# Patient Record
Sex: FEMALE
[De-identification: names, ages, dates, MRNs, and addresses within clinical notes are randomized per-mention and may not be internally consistent; named-entity substitution may affect disease eponyms.]

---

## 2017-04-06 ENCOUNTER — HOSPITAL ENCOUNTER (OUTPATIENT)
Dept: HOSPITAL 14 - H.ER | Age: 67
Setting detail: OBSERVATION
LOS: 1 days | Discharge: HOME | End: 2017-04-07
Attending: FAMILY MEDICINE | Admitting: FAMILY MEDICINE
Payer: MEDICARE

## 2017-04-06 VITALS — RESPIRATION RATE: 20 BRPM

## 2017-04-06 VITALS — BODY MASS INDEX: 37.8 KG/M2

## 2017-04-06 DIAGNOSIS — E11.65: ICD-10-CM

## 2017-04-06 DIAGNOSIS — Z88.6: ICD-10-CM

## 2017-04-06 DIAGNOSIS — Z79.899: ICD-10-CM

## 2017-04-06 DIAGNOSIS — L03.211: Primary | ICD-10-CM

## 2017-04-06 DIAGNOSIS — Z23: ICD-10-CM

## 2017-04-06 DIAGNOSIS — E78.00: ICD-10-CM

## 2017-04-06 DIAGNOSIS — I10: ICD-10-CM

## 2017-04-06 LAB
ALBUMIN/GLOB SERPL: 1.2 {RATIO} (ref 1–2.1)
ALP SERPL-CCNC: 85 U/L (ref 38–126)
ALT SERPL-CCNC: 35 U/L (ref 9–52)
AST SERPL-CCNC: 34 U/L (ref 14–36)
BASOPHILS # BLD AUTO: 0.1 K/UL (ref 0–0.2)
BASOPHILS NFR BLD: 0.9 % (ref 0–2)
BILIRUB SERPL-MCNC: 0.7 MG/DL (ref 0.2–1.3)
BUN SERPL-MCNC: 20 MG/DL (ref 7–17)
CALCIUM SERPL-MCNC: 9.5 MG/DL (ref 8.4–10.2)
CHLORIDE SERPL-SCNC: 97 MMOL/L (ref 98–107)
CO2 SERPL-SCNC: 28 MMOL/L (ref 22–30)
EOSINOPHIL # BLD AUTO: 0.2 K/UL (ref 0–0.7)
EOSINOPHIL NFR BLD: 3.7 % (ref 0–4)
ERYTHROCYTE [DISTWIDTH] IN BLOOD BY AUTOMATED COUNT: 14.5 % (ref 11.5–14.5)
GLOBULIN SER-MCNC: 3.3 GM/DL (ref 2.2–3.9)
GLUCOSE SERPL-MCNC: 403 MG/DL (ref 65–105)
HCT VFR BLD CALC: 43.6 % (ref 34–47)
LYMPHOCYTES # BLD AUTO: 1.6 K/UL (ref 1–4.3)
LYMPHOCYTES NFR BLD AUTO: 23.6 % (ref 20–40)
MCH RBC QN AUTO: 28.5 PG (ref 27–31)
MCHC RBC AUTO-ENTMCNC: 33.4 G/DL (ref 33–37)
MCV RBC AUTO: 85.2 FL (ref 81–99)
MONOCYTES # BLD: 0.4 K/UL (ref 0–0.8)
MONOCYTES NFR BLD: 6.1 % (ref 0–10)
NEUTROPHILS # BLD: 4.4 K/UL (ref 1.8–7)
NEUTROPHILS NFR BLD AUTO: 65.7 % (ref 50–75)
NRBC BLD AUTO-RTO: 0.2 % (ref 0–0)
PLATELET # BLD: 172 K/UL (ref 130–400)
PMV BLD AUTO: 8.9 FL (ref 7.2–11.7)
POTASSIUM SERPL-SCNC: 5.6 MMOL/L (ref 3.6–5)
PROT SERPL-MCNC: 7.2 G/DL (ref 6.3–8.2)
SODIUM SERPL-SCNC: 136 MMOL/L (ref 132–148)
WBC # BLD AUTO: 6.7 K/UL (ref 4.8–10.8)

## 2017-04-06 PROCEDURE — 96361 HYDRATE IV INFUSION ADD-ON: CPT

## 2017-04-06 PROCEDURE — 82948 REAGENT STRIP/BLOOD GLUCOSE: CPT

## 2017-04-06 PROCEDURE — 36415 COLL VENOUS BLD VENIPUNCTURE: CPT

## 2017-04-06 PROCEDURE — 83605 ASSAY OF LACTIC ACID: CPT

## 2017-04-06 PROCEDURE — 93005 ELECTROCARDIOGRAM TRACING: CPT

## 2017-04-06 PROCEDURE — 96360 HYDRATION IV INFUSION INIT: CPT

## 2017-04-06 PROCEDURE — 90732 PPSV23 VACC 2 YRS+ SUBQ/IM: CPT

## 2017-04-06 PROCEDURE — 84132 ASSAY OF SERUM POTASSIUM: CPT

## 2017-04-06 PROCEDURE — 85025 COMPLETE CBC W/AUTO DIFF WBC: CPT

## 2017-04-06 PROCEDURE — 99283 EMERGENCY DEPT VISIT LOW MDM: CPT

## 2017-04-06 PROCEDURE — 87040 BLOOD CULTURE FOR BACTERIA: CPT

## 2017-04-06 PROCEDURE — 96365 THER/PROPH/DIAG IV INF INIT: CPT

## 2017-04-06 PROCEDURE — 80053 COMPREHEN METABOLIC PANEL: CPT

## 2017-04-06 NOTE — ED PDOC
HPI: Skin/Bite Injury


Time Seen by Provider: 04/06/17 13:30


Chief Complaint (Nursing): Abnormal Skin Integrity


Chief Complaint (Provider): Abnormal Skin Integrity


History Per: Patient


History/Exam Limitations: no limitations


Onset/Duration Of Symptoms: Days (x2 days)


Current Symptoms Are (Timing): Still Present


Additional Complaint(s): 


65 y/o female with a past history of diabetes (insulin dependant) who presents 

to the emergency department with a complaint of swelling and redness of the 

face x2 days. Patient states it first started at the chin but progressively 

getting worse and spreading throughout the face. Denies pain or itchiness.   





PMD: Dr. Pooja Gamino MD





Past Medical History


Reviewed: Historical Data, Nursing Documentation, Vital Signs


Vital Signs: 


 Last Vital Signs











Temp  96.9 F L  04/06/17 13:25


 


Pulse  89   04/06/17 13:25


 


Resp  21   04/06/17 13:25


 


BP  149/61   04/06/17 13:25


 


Pulse Ox  97   04/06/17 14:53














- Medical History


PMH: Diabetes, HTN, Hypercholesterolemia





- Surgical History


Surgical History: Cholecystectomy





- Family History


Family History: States: Diabetes





- Home Medications


Home Medications: 


 Ambulatory Orders











 Medication  Instructions  Recorded


 


Ergocalciferol [Vitamin D2] 400 iu PO QWK 03/03/15


 


Glimepiride 4 mg PO BID 03/03/15


 


Lisinopril/Hydrochlorothiazide 1 tab PO DAILY 03/03/15





[Lisinopril-Hydrochlorothiazide  





12.5 mg-10 mg]  


 


MetFORMIN [glucoPHAGE] 1,000 mg PO BID 03/03/15


 


Simvastatin 20 mg PO DAILY 03/03/15


 


Hydrocodone/Ibuprofen [Vicoprofen 1 tab PO Q6H #15 tab 06/19/15





7.5 mg-200 mg]  


 


traMADol [Ultram] 50 mg PO Q8 #20 tab 03/06/16


 


Methylprednisolone [Medrol Dose 4 mg PO DAILY #21 mg 12/21/16





Pack (21 tabs)]  


 


DiphenhydrAMINE [Benadryl] 1 - 2 cap PO Q6 PRN #30 cap 02/25/17


 


Methylprednisolone [Medrol Dose 4 mg PO DAILY #21 mg 02/25/17





Pack (21 tabs)]  














- Allergies


Allergies/Adverse Reactions: 


 Allergies











Allergy/AdvReac Type Severity Reaction Status Date / Time


 


acetaminophen [From Tylenol] Allergy  RASH Verified 03/06/16 10:17














Review of Systems


ROS Statement: Except As Marked, All Systems Reviewed And Found Negative


Skin: Positive for: Other (Swelling and redness of the face and chin. Denies 

pain or itchiness)





Physical Exam





- Reviewed


Nursing Documentation Reviewed: Yes


Vital Signs Reviewed: Yes





- Physical Exam


Appears: Positive for: Non-toxic, No Acute Distress


Skin: Negative for: Normal Color (Induration noted along the chin with redness 

noted along the mallar surfaces and above the right eye. No dental tenderness 

noted.)


Neurologic/Psych: Positive for: Alert, Oriented





- Laboratory Results


Result Diagrams: 


 04/06/17 14:12





 04/06/17 14:12





- ECG


ECG Rhythm: Positive for: Sinus Rhythm (NSR 74BPM NO ECTOPY; NO ACUTE CHANGES)


O2 Sat by Pulse Oximetry: 97 (RA)


Pulse Ox Interpretation: Normal





- Progress


ED Course And Treament: 


ANCEF 1 GM IV X 1 DOS





POTASSIUM ELEVATED.


EKG: NSR 74BPM; NO ECTOPY; NO ACUTE CHANGES





INSULIN 8 UNITS IV GIVEN; NS 1 LITER 500ML PER HOUR





D/W IRENE STAFFORD NP





Medical Decision Making


Medical Decision Making: 


Time: 13:30


Initial impression: Cellulitis


Initial plan:


--COMP Metabolic Panel


--Lact Acid, Plasma


--CBC w/ differential


--Ancef 1 gm/ ml Stat


--Blood Culture Stat


--Revaluation








Scribe Attestation:


Documented by Anu Keene, acting as a scribe for  Eliana wu PA-C.





Provider Scribe Attestation:


All medical record entries made by the Scribe were at my direction and 

personally dictated by me. I have reviewed the chart and agree that the record 

accurately reflects my personal performance of the history, physical exam, 

medical decision making, and the department course for this patient. I have 

also personally directed, reviewed, and agree with the discharge instructions 

and disposition.











Disposition





- Clinical Impression


Clinical Impression: 


 Cellulitis, Cellulitis, Hyperglycemia





- Patient ED Disposition


Is Patient to be Admitted: Yes





- Disposition


Disposition Time: 16:54


Condition: FAIR

## 2017-04-07 VITALS
TEMPERATURE: 97.9 F | DIASTOLIC BLOOD PRESSURE: 79 MMHG | HEART RATE: 68 BPM | OXYGEN SATURATION: 95 % | SYSTOLIC BLOOD PRESSURE: 122 MMHG

## 2017-04-07 LAB
ALBUMIN/GLOB SERPL: 1.1 {RATIO} (ref 1–2.1)
ALP SERPL-CCNC: 76 U/L (ref 38–126)
ALT SERPL-CCNC: 29 U/L (ref 9–52)
AST SERPL-CCNC: 28 U/L (ref 14–36)
BASOPHILS # BLD AUTO: 0 K/UL (ref 0–0.2)
BASOPHILS NFR BLD: 0.5 % (ref 0–2)
BILIRUB SERPL-MCNC: 0.5 MG/DL (ref 0.2–1.3)
BUN SERPL-MCNC: 18 MG/DL (ref 7–17)
CALCIUM SERPL-MCNC: 9.1 MG/DL (ref 8.4–10.2)
CHLORIDE SERPL-SCNC: 101 MMOL/L (ref 98–107)
CO2 SERPL-SCNC: 28 MMOL/L (ref 22–30)
EOSINOPHIL # BLD AUTO: 0.3 K/UL (ref 0–0.7)
EOSINOPHIL NFR BLD: 4 % (ref 0–4)
ERYTHROCYTE [DISTWIDTH] IN BLOOD BY AUTOMATED COUNT: 14.5 % (ref 11.5–14.5)
GLOBULIN SER-MCNC: 3.3 GM/DL (ref 2.2–3.9)
GLUCOSE SERPL-MCNC: 233 MG/DL (ref 65–105)
HCT VFR BLD CALC: 43.5 % (ref 34–47)
LYMPHOCYTES # BLD AUTO: 2 K/UL (ref 1–4.3)
LYMPHOCYTES NFR BLD AUTO: 29.5 % (ref 20–40)
MCH RBC QN AUTO: 28.3 PG (ref 27–31)
MCHC RBC AUTO-ENTMCNC: 33.2 G/DL (ref 33–37)
MCV RBC AUTO: 85.2 FL (ref 81–99)
MONOCYTES # BLD: 0.5 K/UL (ref 0–0.8)
MONOCYTES NFR BLD: 6.8 % (ref 0–10)
NEUTROPHILS # BLD: 4 K/UL (ref 1.8–7)
NEUTROPHILS NFR BLD AUTO: 59.2 % (ref 50–75)
NRBC BLD AUTO-RTO: 0.1 % (ref 0–0)
PLATELET # BLD: 160 K/UL (ref 130–400)
PMV BLD AUTO: 9.7 FL (ref 7.2–11.7)
POTASSIUM SERPL-SCNC: 4.5 MMOL/L (ref 3.6–5)
PROT SERPL-MCNC: 6.9 G/DL (ref 6.3–8.2)
SODIUM SERPL-SCNC: 142 MMOL/L (ref 132–148)
WBC # BLD AUTO: 6.7 K/UL (ref 4.8–10.8)

## 2017-04-07 NOTE — CP.PCM.DIS
Provider





- Provider


Date of Admission: 


04/06/17 16:17





Attending physician: 


Azael Landin MD





Time Spent in preparation of Discharge (in minutes): 15





Diagnosis





- Discharge Diagnosis


(1) Facial cellulitis


Status: Acute





(2) DVT prophylaxis


Status: Acute





(3) Type 2 diabetes mellitus with hyperglycemia


Status: Acute








Hospital Course





- Lab Results


Lab Results: 


 Most Recent Lab Values











WBC  6.7 K/uL (4.8-10.8)   04/07/17  05:50    


 


RBC  5.11 Mil/uL (3.80-5.20)   04/07/17  05:50    


 


Hgb  14.5 g/dL (12.0-16.0)   04/07/17  05:50    


 


Hct  43.5 % (34.0-47.0)   04/07/17  05:50    


 


MCV  85.2 fl (81.0-99.0)   04/07/17  05:50    


 


MCH  28.3 pg (27.0-31.0)   04/07/17  05:50    


 


MCHC  33.2 g/dL (33.0-37.0)   04/07/17  05:50    


 


RDW  14.5 % (11.5-14.5)   04/07/17  05:50    


 


Plt Count  160 K/uL (130-400)   04/07/17  05:50    


 


MPV  9.7 fl (7.2-11.7)   04/07/17  05:50    


 


Neut % (Auto)  59.2 % (50.0-75.0)   04/07/17  05:50    


 


Lymph % (Auto)  29.5 % (20.0-40.0)   04/07/17  05:50    


 


Mono % (Auto)  6.8 % (0.0-10.0)   04/07/17  05:50    


 


Eos % (Auto)  4.0 % (0.0-4.0)   04/07/17  05:50    


 


Baso % (Auto)  0.5 % (0.0-2.0)   04/07/17  05:50    


 


Neut #  4.0 K/uL (1.8-7.0)   04/07/17  05:50    


 


Lymph #  2.0 K/uL (1.0-4.3)   04/07/17  05:50    


 


Mono #  0.5 K/uL (0.0-0.8)   04/07/17  05:50    


 


Eos #  0.3 K/uL (0.0-0.7)   04/07/17  05:50    


 


Baso #  0.0 K/uL (0.0-0.2)   04/07/17  05:50    


 


Sodium  142 mmol/l (132-148)   04/07/17  05:50    


 


Potassium  4.5 MMOL/L (3.6-5.0)   04/07/17  05:50    


 


Chloride  101 mmol/L ()   04/07/17  05:50    


 


Carbon Dioxide  28 mmol/L (22-30)   04/07/17  05:50    


 


Anion Gap  18  (10-20)   04/07/17  05:50    


 


BUN  18 mg/dl (7-17)  H  04/07/17  05:50    


 


Creatinine  0.9 mg/dL (0.7-1.2)   04/07/17  05:50    


 


Est GFR ( Amer)  > 60   04/07/17  05:50    


 


Est GFR (Non-Af Amer)  > 60   04/07/17  05:50    


 


POC Glucose (mg/dL)  365 mg/dL ()  H  04/07/17  10:42    


 


Random Glucose  233 mg/dL ()  H  04/07/17  05:50    


 


Lactic Acid  2.1 MMOL/L (0.7-2.1)   04/06/17  14:12    


 


Calcium  9.1 mg/dL (8.4-10.2)   04/07/17  05:50    


 


Total Bilirubin  0.5 mg/dl (0.2-1.3)   04/07/17  05:50    


 


AST  28 U/L (14-36)   04/07/17  05:50    


 


ALT  29 U/L (9-52)   04/07/17  05:50    


 


Alkaline Phosphatase  76 U/L ()   04/07/17  05:50    


 


Total Protein  6.9 G/DL (6.3-8.2)   04/07/17  05:50    


 


Albumin  3.6 g/dL (3.5-5.0)   04/07/17  05:50    


 


Globulin  3.3 gm/dL (2.2-3.9)   04/07/17  05:50    


 


Albumin/Globulin Ratio  1.1  (1.0-2.1)   04/07/17  05:50    














Discharge Exam





- Head Exam


Head Exam: ATRAUMATIC, NORMAL INSPECTION, NORMOCEPHALIC





Discharge Plan





- Discharge Medications


Prescriptions: 


Cephalexin [cephalexin] 500 mg PO Q8 #18 cap





- Follow Up Plan


Condition: GOOD


Disposition: HOME/ ROUTINE


Additional Instructions: 


patient cleared for discharge to home today , d/w [facility]


f/u with pcp  in 1 week


pt . and family member at bedside instructed to return to ER if sx worsen, fever

, chill, worsening rash


Rx for Keflex provided





final dx-facial cellulitis improving


f/u pmd, rted prn, meds pe rmed rec


Referrals: 


Pooja Gamino, ANUSHA [Advanced Practice Nurse] -

## 2017-04-07 NOTE — CARD
--------------- APPROVED REPORT --------------





EKG Measurement

Heart [id number]

IN [id number]

[id number]

[id number]

[id number]



<Conclusion>

Normal sinus rhythm

Minimal voltage criteria for LVH, may be normal variant

Borderline ECG

## 2017-04-07 NOTE — CP.PCM.HP
History of Present Illness





- History of Present Illness


History of Present Illness: 


pt admitted for facial cellulitis, was initially tx w/ topical steroids for 

rash to face. at present feels better. denies resp or swalling difficulties. no 

f/c, n/v/d.  bw noted. no wbc. glucose elevated.  pt c/o s/s 3 days pta.  





Present on Admission





- Present on Admission


Any Indicators Present on Admission: Yes


History of Uncontrolled Diabetes: Yes





Review of Systems





- Integumentary


Integumentary: As Per HPI, Erythema





Past Patient History





- Infectious Disease


Hx of Infectious Diseases: None





- Past Medical History & Family History


Past Medical History?: Yes





- Past Social History


Smoking Status: Never Smoked





- CARDIAC


Hx Hypercholesterolemia: Yes


Hx Hypertension: Yes





- PULMONARY


Hx Respiratory Disorders: No





- NEUROLOGICAL


Hx Neurological Disorder: No





- HEENT


Hx HEENT Problems: Yes





- ENDOCRINE/METABOLIC


Hx Endocrine Disorders: Yes





- HEMATOLOGICAL/ONCOLOGICAL


Hx Blood Disorders: No





- INTEGUMENTARY


Hx Dermatological Problems: No





- MUSCULOSKELETAL/RHEUMATOLOGICAL


Hx Musculoskeletal Disorders: No





- GASTROINTESTINAL


Hx Gastrointestinal Disorders: No





- GENITOURINARY/GYNECOLOGICAL


Hx Genitourinary Disorders: No





- PSYCHIATRIC


Hx Psychophysiologic Disorder: No





- SURGICAL HISTORY


Hx Cholecystectomy: Yes





- ANESTHESIA


Hx Anesthesia: Yes


Hx Anesthesia Reactions: No


Hx Malignant Hyperthermia: No





Meds


Home Medications: 


 Home Medication List











 Medication  Instructions  Recorded  Confirmed  Type


 


Cephalexin [cephalexin] 500 mg PO Q8 #18 cap 04/07/17  Rx











Allergies/Adverse Reactions: 


 Allergies











Allergy/AdvReac Type Severity Reaction Status Date / Time


 


acetaminophen [From Tylenol] Allergy  RASH Verified 03/06/16 10:17














Physical Exam





- Constitutional


Appears: Well, Non-toxic, No Acute Distress





- Head Exam


Head Exam: ATRAUMATIC, NORMAL INSPECTION, NORMOCEPHALIC





- Eye Exam


Eye Exam: EOMI, Normal appearance, PERRL


Pupil Exam: NORMAL ACCOMODATION, PERRL





- ENT Exam


ENT Exam: Mucous Membranes Moist, Normal Exam





- Neck Exam


Neck exam: Positive for: Normal Inspection





- Respiratory Exam


Respiratory Exam: Clear to Auscultation Bilateral, NORMAL BREATHING PATTERN





- Cardiovascular Exam


Cardiovascular Exam: REGULAR RHYTHM, RRR, +S1, +S2





- GI/Abdominal Exam


GI & Abdominal Exam: Normal Bowel Sounds, Soft.  absent: Tenderness





- Extremities Exam


Extremities exam: Positive for: full ROM, normal capillary refill, normal 

inspection, pedal pulses present





- Back Exam


Back exam: NORMAL INSPECTION





- Neurological Exam


Neurological exam: Alert, CN II-XII Intact, Normal Gait, Oriented x3, Reflexes 

Normal





- Psychiatric Exam


Psychiatric exam: Normal Affect, Normal Mood





- Skin


Skin Exam: Dry, Intact, Normal Color, Warm





Results





- Vital Signs


Recent Vital Signs: 





 Last Vital Signs











Temp  98.4 F   04/06/17 19:59


 


Pulse  72   04/06/17 19:59


 


Resp  20   04/06/17 19:59


 


BP  99/62 L  04/06/17 19:59


 


Pulse Ox  96   04/06/17 19:59














- Labs


Result Diagrams: 


 04/07/17 05:50





 04/07/17 05:50


Labs: 





 Laboratory Results - last 24 hr











  04/06/17 04/06/17 04/06/17





  16:42 17:59 21:47


 


Potassium   4.1 


 


POC Glucose (mg/dL)  236 H   253 H














  04/07/17





  06:01


 


Potassium 


 


POC Glucose (mg/dL)  246 H














Assessment & Plan


(1) Facial cellulitis


Assessment and Plan: 


ancef


ivf


pain control


?? dc today


Status: Acute





(2) DVT prophylaxis


Assessment and Plan: 


scd flakita e hose


ambulation


Status: Acute





(3) Type 2 diabetes mellitus with hyperglycemia


Assessment and Plan: 


riss fsbg, home meds


Status: Acute








Decision To Admit





- Pt Status Changed To:


Hospital Disposition Of: Observation





- .


Bed Request Type: Med/Surg


Admitting Physician: Azael Landin

## 2017-08-02 ENCOUNTER — HOSPITAL ENCOUNTER (EMERGENCY)
Dept: HOSPITAL 14 - H.ER | Age: 67
Discharge: HOME | End: 2017-08-02
Payer: MEDICARE

## 2017-08-02 VITALS
SYSTOLIC BLOOD PRESSURE: 140 MMHG | RESPIRATION RATE: 16 BRPM | HEART RATE: 72 BPM | DIASTOLIC BLOOD PRESSURE: 71 MMHG | TEMPERATURE: 98 F

## 2017-08-02 VITALS — OXYGEN SATURATION: 98 %

## 2017-08-02 VITALS — BODY MASS INDEX: 37.8 KG/M2

## 2017-08-02 DIAGNOSIS — Z79.84: ICD-10-CM

## 2017-08-02 DIAGNOSIS — L03.211: Primary | ICD-10-CM

## 2017-08-02 DIAGNOSIS — E11.9: ICD-10-CM

## 2017-08-02 DIAGNOSIS — E78.00: ICD-10-CM

## 2017-08-02 DIAGNOSIS — I10: ICD-10-CM

## 2017-08-02 LAB
ALBUMIN/GLOB SERPL: 1.2 {RATIO} (ref 1–2.1)
ALP SERPL-CCNC: 62 U/L (ref 38–126)
ALT SERPL-CCNC: 39 U/L (ref 9–52)
AST SERPL-CCNC: 38 U/L (ref 14–36)
BASOPHILS # BLD AUTO: 0.1 K/UL (ref 0–0.2)
BASOPHILS NFR BLD: 1.2 % (ref 0–2)
BILIRUB SERPL-MCNC: 0.7 MG/DL (ref 0.2–1.3)
BUN SERPL-MCNC: 23 MG/DL (ref 7–17)
CALCIUM SERPL-MCNC: 9.2 MG/DL (ref 8.4–10.2)
CHLORIDE SERPL-SCNC: 104 MMOL/L (ref 98–107)
CO2 SERPL-SCNC: 23 MMOL/L (ref 22–30)
EOSINOPHIL # BLD AUTO: 0.2 K/UL (ref 0–0.7)
EOSINOPHIL NFR BLD: 3.8 % (ref 0–4)
ERYTHROCYTE [DISTWIDTH] IN BLOOD BY AUTOMATED COUNT: 14.1 % (ref 11.5–14.5)
GLOBULIN SER-MCNC: 3.4 GM/DL (ref 2.2–3.9)
GLUCOSE SERPL-MCNC: 162 MG/DL (ref 65–105)
HCT VFR BLD CALC: 41.7 % (ref 34–47)
LYMPHOCYTES # BLD AUTO: 1.6 K/UL (ref 1–4.3)
LYMPHOCYTES NFR BLD AUTO: 25.1 % (ref 20–40)
MCH RBC QN AUTO: 28.2 PG (ref 27–31)
MCHC RBC AUTO-ENTMCNC: 33.3 G/DL (ref 33–37)
MCV RBC AUTO: 84.7 FL (ref 81–99)
MONOCYTES # BLD: 0.4 K/UL (ref 0–0.8)
MONOCYTES NFR BLD: 6.5 % (ref 0–10)
NEUTROPHILS # BLD: 4.1 K/UL (ref 1.8–7)
NEUTROPHILS NFR BLD AUTO: 63.4 % (ref 50–75)
NRBC BLD AUTO-RTO: 0 % (ref 0–0)
PLATELET # BLD: 168 K/UL (ref 130–400)
PMV BLD AUTO: 8.4 FL (ref 7.2–11.7)
POTASSIUM SERPL-SCNC: 4.9 MMOL/L (ref 3.6–5)
PROT SERPL-MCNC: 7.5 G/DL (ref 6.3–8.2)
SODIUM SERPL-SCNC: 137 MMOL/L (ref 132–148)
WBC # BLD AUTO: 6.5 K/UL (ref 4.8–10.8)

## 2017-08-02 PROCEDURE — 87040 BLOOD CULTURE FOR BACTERIA: CPT

## 2017-08-02 PROCEDURE — 86038 ANTINUCLEAR ANTIBODIES: CPT

## 2017-08-02 PROCEDURE — 86376 MICROSOMAL ANTIBODY EACH: CPT

## 2017-08-02 PROCEDURE — 86160 COMPLEMENT ANTIGEN: CPT

## 2017-08-02 PROCEDURE — 82948 REAGENT STRIP/BLOOD GLUCOSE: CPT

## 2017-08-02 PROCEDURE — 80053 COMPREHEN METABOLIC PANEL: CPT

## 2017-08-02 PROCEDURE — 99282 EMERGENCY DEPT VISIT SF MDM: CPT

## 2017-08-02 PROCEDURE — 85025 COMPLETE CBC W/AUTO DIFF WBC: CPT

## 2017-08-02 PROCEDURE — 96374 THER/PROPH/DIAG INJ IV PUSH: CPT

## 2017-08-02 PROCEDURE — 86431 RHEUMATOID FACTOR QUANT: CPT

## 2017-08-02 PROCEDURE — 83516 IMMUNOASSAY NONANTIBODY: CPT

## 2017-08-02 PROCEDURE — 86235 NUCLEAR ANTIGEN ANTIBODY: CPT

## 2017-08-02 PROCEDURE — 83605 ASSAY OF LACTIC ACID: CPT

## 2017-08-02 PROCEDURE — 96375 TX/PRO/DX INJ NEW DRUG ADDON: CPT

## 2017-08-02 NOTE — ED PDOC
HPI: Skin/Bite Injury


Time Seen by Provider: 08/02/17 14:12


Chief Complaint (Nursing): Abnormal Skin Integrity


Chief Complaint (Provider): facial rash


History Per: Patient


History/Exam Limitations: no limitations


Onset/Duration Of Symptoms: Waxing/Waning (for 6 months), Worse Since (2 weeks)


Location Of Injury: Right: Face, Left: Face, Anterior: Face


Quality Of Symptoms: Itching, Swollen


Additional Complaint(s): 





Facial rash ongoing for 6 months, with varying degrees of severity.


Had been diagnosed with rosacea by a dermatologist in [place], but and it 

improved with topical steroid


Also diagnosed with facial cellulitis in April, but no improvement after 

antibiotics.


Intermittently improves with oral steroid but it causes her sugar to be very 

elevated.


Last 2 weeks has developed increased swelling again, but also associated with 

itching with she has never had before.


Denies fever, chills, throat/tongue swelling, chest pain or shortness of breath

, or rash elsewhere.





PMD: Dr Gamino





Past Medical History


Reviewed: Historical Data, Nursing Documentation, Vital Signs


Vital Signs: 


 Last Vital Signs











Temp  98 F   08/02/17 14:04


 


Pulse  80   08/02/17 14:04


 


Resp  18   08/02/17 14:04


 


BP  146/66   08/02/17 14:04


 


Pulse Ox  98   08/02/17 14:52














- Medical History


PMH: Diabetes, HTN, Hypercholesterolemia





- Surgical History


Surgical History: Cholecystectomy





- Family History


Family History: States: Diabetes





- Social History


Current smoker - smoking cessation education provided: No


Alcohol: None





- Home Medications


Home Medications: 


 Ambulatory Orders











 Medication  Instructions  Recorded


 


Glimepiride 4 mg PO BID 03/03/15


 


Lisinopril/Hydrochlorothiazide 1 tab PO DAILY 03/03/15





[Lisinopril-Hctz 10-12.5 mg Tab]  


 


MetFORMIN [glucoPHAGE] 1,000 mg PO BID 03/03/15


 


Simvastatin 20 mg PO DAILY 03/03/15


 


Cephalexin [cephalexin] 500 mg PO Q8 #18 cap 04/07/17


 


Betamethasone Valerate 0.1% 1 appl TOP BID #1 tube 08/02/17





[Valisone]  


 


Clindamycin [Cleocin] 300 mg PO TID #30 cap 08/02/17


 


DiphenhydrAMINE [Benadryl] 25 mg PO Q6 PRN #30 cap 08/02/17














- Allergies


Allergies/Adverse Reactions: 


 Allergies











Allergy/AdvReac Type Severity Reaction Status Date / Time


 


acetaminophen [From Tylenol] Allergy  RASH Verified 08/02/17 14:02














Review of Systems


ROS Statement: Except As Marked, All Systems Reviewed And Found Negative (and 

as per HPI)


Constitutional: Negative for: Fever, Chills


ENT: Negative for: Mouth Swelling, Throat Pain


Cardiovascular: Negative for: Chest Pain, Light Headedness


Respiratory: Negative for: Cough, Shortness of Breath


Skin: Positive for: Rash





Physical Exam





- Reviewed


Nursing Documentation Reviewed: Yes


Vital Signs Reviewed: Yes





- Physical Exam


Appears: Positive for: Non-toxic, No Acute Distress


Head Exam: Positive for: ATRAUMATIC, NORMOCEPHALIC


Skin: Positive for: Warm, Dry, Rash (Blanching erythematous edematous rash to 

chin, nose, malar area and mid forehead with indurated areas especially to chin

, nasal bridge and midline forehead. No tenderness. No fluctuance. No discrete 

lesions/papules.)


Eye Exam: Positive for: EOMI, PERRL


ENT: Negative for: Pharyngeal Erythema, Tonsillar Exudate


Neck: Positive for: Painless ROM, Supple


Cardiovascular/Chest: Positive for: Regular Rate, Rhythm, Chest Non Tender.  

Negative for: Murmur


Respiratory: Positive for: Normal Breath Sounds.  Negative for: Respiratory 

Distress


Extremity: Positive for: Normal ROM.  Negative for: Pedal Edema


Lymphatic: Negative for: Adenopathy


Neurologic/Psych: Positive for: Alert.  Negative for: Motor/Sensory Deficits





- Laboratory Results


Result Diagrams: 


 08/02/17 15:33





 08/02/17 15:33


Interpretation Of Abn Labs: Minimally elevated BUN and glucose.  No emergently 

critical lab abnormalities





- ECG


O2 Sat by Pulse Oximetry: 98


Pulse Ox Interpretation: Normal





Medical Decision Making


Medical Decision Making: 





Facial erythema/edema/induration ongoing for months with unclear etiology.


Will treat as both inflammatory and infectious process for now, but pt needs to 

revisit dermatologist for further management.


LUCIO pt findings and plan of care.





Disposition





- Clinical Impression


Clinical Impression: 


 Facial rash





Counseled Patient/Family Regarding: Studies Performed, Diagnosis, Need For 

Followup, Rx Given





- Disposition


Referrals: 


Priyanka Gamino MD [Family Provider] - 


 Service [Outside] (VISITA UN SPECIALIST DE PIEL EN 2-3 JIMÉNEZ POR MAS 

EVALUACIONES Y TRATIMIENTE.)


Disposition: Routine/Home


Disposition Time: 16:19


Condition: STABLE


Additional Instructions: 


IT IS VERY IMPORTANT YOU SEE A DERMATOLOGIST FOR FURTHER TREATMENT OF YOUR 

CONDITION





CHECK YOUR SUGAR FREQUENTLY FOR THE NEXT 2 WEEKS BECAUSE STEROIDC CAN CAUSE 

INCREASE IN SUGAR





FOLLOW STRICT DIABETIC DIET FOR NEXT 2 WEEKS.








ES MUY IMPORTANTE ANNAMARIE UN DERMATLOGO PARA TRATAMIENTO ADICIONAL DE GAMBINO CONDICIN





COMPRUEBE GAMBINO AZCAR FRECUENTEMENTE POR LAS PROXIMAS 2 SEMANAS PORQUE ESTEROIDES 

PUEDE CAUSAR AUMENTO EN AZCAR





SIGUE JOSE ANTONIO DIETA DIABTICA ESTRICTA PARA LAS PROXIMAS 2 SEMANAS.





Prescriptions: 


Betamethasone Valerate 0.1% [Valisone] 1 appl TOP BID #1 tube


Clindamycin [Cleocin] 300 mg PO TID #30 cap


DiphenhydrAMINE [Benadryl] 25 mg PO Q6 PRN #30 cap


 PRN Reason: Itching / Pruritus


Instructions:  Dermatitis (ED)


Print Language: Japanese

## 2017-08-09 LAB
ANA TITR SER IF: (no result) {TITER}
ENA SM AB SER-ACNC: (no result)
Lab: (no result)
MYOCARDIAL AB IF: (no result)
MYOCARDIAL AB TITER: (no result)
PARIETAL CELL AB: (no result)
RETICULIN IGA TITR SER IF: (no result) {TITER}
RETICULIN IGA TITR SER IF: (no result) {TITER}
RHEUMATOID FACT SERPL-ACNC: (no result) [IU]/ML
STRIATED MUSCLE AB TITER: (no result)
STRIATED MUSCLE AB: (no result)

## 2017-09-24 ENCOUNTER — HOSPITAL ENCOUNTER (EMERGENCY)
Dept: HOSPITAL 14 - H.ER | Age: 67
Discharge: HOME | End: 2017-09-24
Payer: MEDICARE

## 2017-09-24 VITALS
DIASTOLIC BLOOD PRESSURE: 63 MMHG | SYSTOLIC BLOOD PRESSURE: 158 MMHG | TEMPERATURE: 98 F | RESPIRATION RATE: 16 BRPM | HEART RATE: 98 BPM | OXYGEN SATURATION: 98 %

## 2017-09-24 VITALS — BODY MASS INDEX: 37.8 KG/M2

## 2017-09-24 DIAGNOSIS — T78.40XA: Primary | ICD-10-CM

## 2017-09-24 PROCEDURE — 99282 EMERGENCY DEPT VISIT SF MDM: CPT

## 2017-09-24 PROCEDURE — 96372 THER/PROPH/DIAG INJ SC/IM: CPT

## 2017-09-24 NOTE — ED PDOC
HPI: Allergic Reaction


Time Seen by Provider: 09/24/17 12:38


Chief Complaint (Nursing): Allergic Reaction


Chief Complaint (Provider): Rash


History Per: Patient


History/Exam Limitations: no limitations


Onset/Duration Of Symptoms: Days (x2 months)


Additional Complaint(s): 





Anne Zuleta is a 66 year old female presenting to the ED for an evaluation of 

a red rash on her face occurring for 2 months, reporting this rash has been 

coming and going chronically for 9 months prior to arrival. She states she has 

previously been admitted for cellulitis and was on IV antibiotics. She is 

currently on Zyrtec, topical fungal medication, and doxycycline prescribed by 

her dermatologist, taken without relief. The patient is complaining of 

itchiness which prompted her ED visit today.





PMD: Albert Guaman MD








Past Medical History


Reviewed: Historical Data, Nursing Documentation, Vital Signs


Vital Signs: 


 Last Vital Signs











Temp  98.0 F   09/24/17 12:34


 


Pulse  98 H  09/24/17 12:34


 


Resp  16   09/24/17 12:34


 


BP  158/63 H  09/24/17 12:34


 


Pulse Ox  98   09/24/17 12:34














- Medical History


PMH: Diabetes, HTN, Hypercholesterolemia





- Surgical History


Surgical History: Cholecystectomy





- Family History


Family History: States: Diabetes





- Home Medications


Home Medications: 


 Ambulatory Orders











 Medication  Instructions  Recorded


 


Glimepiride 4 mg PO BID 03/03/15


 


Lisinopril/Hydrochlorothiazide 1 tab PO DAILY 03/03/15





[Lisinopril-Hctz 10-12.5 mg Tab]  


 


MetFORMIN [glucoPHAGE] 1,000 mg PO BID 03/03/15


 


Simvastatin 20 mg PO DAILY 03/03/15


 


Cephalexin [cephalexin] 500 mg PO Q8 #18 cap 04/07/17


 


Betamethasone Valerate 0.1% 1 appl TOP BID #1 tube 08/02/17





[Valisone]  


 


Clindamycin [Cleocin] 300 mg PO TID #30 cap 08/02/17


 


DiphenhydrAMINE [Benadryl] 25 mg PO Q6 PRN #30 cap 08/02/17


 


hydrOXYzine Pamoate [Vistaril] 50 mg PO BID PRN #15 cap 09/24/17


 


predniSONE [predniSONE Tab] 20 mg PO DAILY #12 tab 09/24/17














- Allergies


Allergies/Adverse Reactions: 


 Allergies











Allergy/AdvReac Type Severity Reaction Status Date / Time


 


acetaminophen [From Tylenol] Allergy  RASH Verified 08/02/17 14:02














Review of Systems


ROS Statement: Except As Marked, All Systems Reviewed And Found Negative


Skin: Positive for: Rash (associated with itchiness to face)





Physical Exam





- Reviewed


Nursing Documentation Reviewed: Yes


Vital Signs Reviewed: Yes





- Physical Exam


Appears: Positive for: Non-toxic, No Acute Distress


Head Exam: Positive for: ATRAUMATIC, NORMOCEPHALIC


Skin: Positive for: Rash (pruritic rash to face)


Neurologic/Psych: Positive for: Alert, Oriented





- ECG


O2 Sat by Pulse Oximetry: 98 (RA)


Pulse Ox Interpretation: Normal





Disposition





- Clinical Impression


Clinical Impression: 


 Rash








- Disposition


Disposition Time: 13:25


Condition: STABLE


Additional Instructions: 


Continue current medications





Prescriptions: 


hydrOXYzine Pamoate [Vistaril] 50 mg PO BID PRN #15 cap


 PRN Reason: Itching / Pruritus


predniSONE [predniSONE Tab] 20 mg PO DAILY #12 tab


Instructions:  Dermatitis (ED)


Forms:  [facility] (English)


Print Language: Faroese





Medical Decision Making


Medical Decision Making: 





Time: 12:38


Impression: Pruritic rash to face


Plan: 


* SOLU-Medrol 125 mg IM


* Vistaril 50 mg PO 





Discussed with pt to take medications as prescribed and to follow up with 

dermatologist.





--------------------------------------------------------------------------------

----------------- 


Scribe Attestation:


Documented by Marcela Bermudez, acting as a scribe for Latia Shine PA-C.


 


Provider Scribe Attestation:


All medical record entries made by the Scribe were at my direction and 

personally dictated by me. I have reviewed the chart and agree that the record 

accurately reflects my personal performance of the history, physical exam, 

medical decision making, and the department course for this patient. I have 

also personally directed, reviewed, and agree with the discharge instructions 

and disposition.

## 2018-10-20 ENCOUNTER — HOSPITAL ENCOUNTER (EMERGENCY)
Dept: HOSPITAL 14 - H.ER | Age: 68
Discharge: HOME | End: 2018-10-20
Payer: COMMERCIAL

## 2018-10-20 VITALS
DIASTOLIC BLOOD PRESSURE: 80 MMHG | TEMPERATURE: 97.8 F | HEART RATE: 84 BPM | RESPIRATION RATE: 18 BRPM | SYSTOLIC BLOOD PRESSURE: 128 MMHG

## 2018-10-20 VITALS — BODY MASS INDEX: 40.1 KG/M2

## 2018-10-20 DIAGNOSIS — M54.5: Primary | ICD-10-CM

## 2018-10-20 DIAGNOSIS — I10: ICD-10-CM

## 2018-10-20 DIAGNOSIS — E78.00: ICD-10-CM

## 2018-10-20 DIAGNOSIS — E11.9: ICD-10-CM

## 2018-10-20 DIAGNOSIS — G89.29: ICD-10-CM

## 2018-10-20 DIAGNOSIS — Z79.84: ICD-10-CM

## 2018-10-20 LAB
BASOPHILS # BLD AUTO: 0.1 K/UL (ref 0–0.2)
BASOPHILS NFR BLD: 1.1 % (ref 0–2)
BUN SERPL-MCNC: 25 MG/DL (ref 7–17)
CALCIUM SERPL-MCNC: 9.5 MG/DL (ref 8.4–10.2)
EOSINOPHIL # BLD AUTO: 0.3 K/UL (ref 0–0.7)
EOSINOPHIL NFR BLD: 5 % (ref 0–4)
ERYTHROCYTE [DISTWIDTH] IN BLOOD BY AUTOMATED COUNT: 14.7 % (ref 11.5–14.5)
ERYTHROCYTE [SEDIMENTATION RATE] IN BLOOD: 19 MM/HR (ref 0–30)
GFR NON-AFRICAN AMERICAN: 50
HGB BLD-MCNC: 15.1 G/DL (ref 12–16)
LYMPHOCYTES # BLD AUTO: 1.8 K/UL (ref 1–4.3)
LYMPHOCYTES NFR BLD AUTO: 26.4 % (ref 20–40)
MCH RBC QN AUTO: 28.7 PG (ref 27–31)
MCHC RBC AUTO-ENTMCNC: 33.1 G/DL (ref 33–37)
MCV RBC AUTO: 86.6 FL (ref 81–99)
MONOCYTES # BLD: 0.4 K/UL (ref 0–0.8)
MONOCYTES NFR BLD: 6.5 % (ref 0–10)
NEUTROPHILS # BLD: 4.1 K/UL (ref 1.8–7)
NEUTROPHILS NFR BLD AUTO: 61 % (ref 50–75)
NRBC BLD AUTO-RTO: 0.2 % (ref 0–0)
PLATELET # BLD: 197 K/UL (ref 130–400)
PMV BLD AUTO: 8.1 FL (ref 7.2–11.7)
RBC # BLD AUTO: 5.26 MIL/UL (ref 3.8–5.2)
WBC # BLD AUTO: 6.7 K/UL (ref 4.8–10.8)

## 2018-10-20 PROCEDURE — 71045 X-RAY EXAM CHEST 1 VIEW: CPT

## 2018-10-20 PROCEDURE — 72131 CT LUMBAR SPINE W/O DYE: CPT

## 2018-10-20 PROCEDURE — 96374 THER/PROPH/DIAG INJ IV PUSH: CPT

## 2018-10-20 PROCEDURE — 80048 BASIC METABOLIC PNL TOTAL CA: CPT

## 2018-10-20 PROCEDURE — 85025 COMPLETE CBC W/AUTO DIFF WBC: CPT

## 2018-10-20 PROCEDURE — 99284 EMERGENCY DEPT VISIT MOD MDM: CPT

## 2018-10-20 PROCEDURE — 85651 RBC SED RATE NONAUTOMATED: CPT

## 2018-10-20 NOTE — ED PDOC
HPI: Back


Time Seen by Provider: 10/20/18 08:54


Chief Complaint (Nursing): Back Pain


Chief Complaint (Provider): Back Pain


History Per: Patient


History/Exam Limitations: no limitations


Onset/Duration Of Symptoms: Days (x1 month )


Current Symptoms Are (Timing): Constant


Additional Complaint(s): 





67 year old female with a history of hypertension and dm presents to the ED with

constant lower back pain onset one month. Patient reports pain is worse with all

movement, she is only comfortable when sitting. She took Advil for pain with 

little relief. Patient denies fever, abdominal pain, vomiting, urinary symptoms,

or any other medical complaints. She has not seen a doctor for pain. 





PMD: Dr. Shelton, [facility]





Past Medical History


Reviewed: Historical Data, Nursing Documentation, Vital Signs


Vital Signs: 


                                Last Vital Signs











Temp  97 F L  10/20/18 08:35


 


Pulse  81   10/20/18 08:35


 


Resp  20   10/20/18 08:35


 


BP  117/72   10/20/18 08:35


 


Pulse Ox  98   10/20/18 08:35














- Medical History


PMH: Arthritis, Diabetes, HTN, Hypercholesterolemia





- Surgical History


Surgical History: Cholecystectomy





- Family History


Family History: States: Diabetes





- Home Medications


Home Medications: 


                                Ambulatory Orders











 Medication  Instructions  Recorded


 


RX: Glimepiride 4 mg PO BID 03/03/15


 


RX: Lisinopril/Hydrochlorothiazide 1 tab PO DAILY 03/03/15





[Lisinopril-Hctz 10-12.5 mg Tab]  


 


RX: MetFORMIN [glucoPHAGE] 1,000 mg PO BID 03/03/15


 


RX: Simvastatin 20 mg PO DAILY 03/03/15


 


Cephalexin [cephalexin] 500 mg PO Q8 #18 cap 04/07/17


 


DiphenhydrAMINE [Benadryl] 25 mg PO Q6 PRN #30 cap 08/02/17


 


RX: Betamethasone Valerate 0.1% 1 appl TOP BID #1 tube 08/02/17





[Valisone]  


 


RX: Clindamycin [Cleocin] 300 mg PO TID #30 cap 08/02/17


 


RX: predniSONE [predniSONE Tab] 20 mg PO DAILY #12 tab 09/24/17


 


hydrOXYzine Pamoate [Vistaril] 50 mg PO BID PRN #15 cap 09/24/17


 


Cyclobenzaprine [Cyclobenzaprine 10 mg PO TID #15 tab 10/20/18





HCl]  


 


RX: Naproxen 375 mg PO Q8 PRN #21 tablet 10/20/18














- Allergies


Allergies/Adverse Reactions: 


                                    Allergies











Allergy/AdvReac Type Severity Reaction Status Date / Time


 


acetaminophen [From Tylenol] Allergy  RASH Verified 09/22/18 13:27














Review of Systems


ROS Statement: Except As Marked, All Systems Reviewed And Found Negative


Musculoskeletal: Positive for: Back Pain (lower)





Physical Exam





- Reviewed


Nursing Documentation Reviewed: Yes


Vital Signs Reviewed: Yes





- Physical Exam


Appears: Positive for: Non-toxic, No Acute Distress


Head Exam: Positive for: ATRAUMATIC, NORMOCEPHALIC


Skin: Positive for: Normal Color, Warm, Dry


Eye Exam: Positive for: Normal appearance, EOMI, PERRL


Neck: Positive for: Normal, Painless ROM, Supple


Cardiovascular/Chest: Positive for: Regular Rate, Rhythm.  Negative for: Murmur


Respiratory: Positive for: Normal Breath Sounds.  Negative for: Respiratory 

Distress


Gastrointestinal/Abdominal: Positive for: Normal Exam, Soft.  Negative for: 

Tenderness


Back: Positive for: Muscle Spasm, Other (Tenderness to palpation in paraspinal 

region of lower lumbar).  Negative for: L CVA Tenderness, R CVA Tenderness


Extremity: Positive for: Normal ROM (upper and lower).  Negative for: Pedal 

Edema, Deformity





- Laboratory Results


Result Diagrams: 


                                 10/20/18 10:03





                                 10/20/18 10:03





- ECG


O2 Sat by Pulse Oximetry: 98 (RA)


Pulse Ox Interpretation: Normal





- Progress


Re-evaluation Time: 12:00


Condition: Re-examined, Improved





Medical Decision Making


Medical Decision Making: 





Time: 0923


Initial Impression: Acute chronic back pain 


Differential diagnoses include but are not limited: lumbar spine DJD, lumbar 

radiculopathy, arthritis, other conditions considered but not listed.


Initial Plan:


--CT lumbar spine


--BMP


--Urine dip


--CBC with differentials


--Erythrocyte sedimentation rate


--Chest x-ray


--Flexeril 10 mg PO


--Morphine 2 mg IVP





Time: 1036


Chest x-ray:


FINDINGS:





LUNGS:


Minimal bibasilar atelectasis





PLEURA:


No significant pleural effusion identified, no pneumothorax apparent.





CARDIOVASCULAR:


Mild aortic atherosclerotic calcification present





Normal.





OSSEOUS STRUCTURES:


No significant abnormalities.





VISUALIZED UPPER ABDOMEN:


Normal.





OTHER FINDINGS:


None.





IMPRESSION:


Minimal bibasilar atelectasis.





Time: 1054


Lumbar CT


FINDINGS:





VERTEBRAE:


No acute compression fractures nor retropulsed fragments.  Minor chronic 

appearing anterior stature loss of several lower thoracic and upper lumbar 

segments felt to be degenerative in origin.  Vertebral bodies otherwise exhibit 

relatively normal stature.  Vertebral bodies and facets normally aligned. 





DISCS/SPINAL CANAL/NEURAL FORAMINA:


L1-2:    Mild posterior disc space narrowing.  No disc herniation or significant

disc bulge. 





L2-3:    There is mild disc space narrowing with tiny chronic appearing 

Schmorl's node changes.  Small broad-based disc bulge is present and results in 

some flattening of the ventral borders of the thecal sac however the overall 

central canal appears adequate. 





L3-4:    Minor posterior disc space narrowing with mild broad-based bulge of the

posterior annulus that extends slightly into the proximal inferior borders of 

both exit foramina..  No disc herniation 





L4-5: Disc space height maintained.  No disc herniation however minor broad-

based disc bulging changes most pronounced within the proximal inferior margins 

of both exit foramina.  Facet joints mildly prominent.  Central canal and exit 

foramina appear adequate 





. 





L5-S1:  Disc space height relatively maintained.  Minor broad-based bulge of the

posterior annulus is present slightly larger on the left than right.  The 

overall central bony canal appears adequate without evidence of compressive 

effects on the thecal sac or exiting nerve roots.  Facet joints slightly 

overgrown.  Left exit foramen is marginal to minimally narrowed.  Right exit 

foramen is adequate.. 





PARASPINAL SOFT TISSUES:


Unremarkable. 





OTHER FINDINGS:


There is mild aortic atherosclerotic calcification and mural plaque 





IMPRESSION:


No acute fractures.  Mild multilevel degenerative spondylosis without 

significant central canal or foraminal stenosis.








 





-----

--------------------------------------------------------------------------------


------------


Scribe Attestation:


Documented by Leia Bermudez, acting as a scribe for Candelario Martinez MD





Provider Scribe Attestation:


All medical record entries made by the Scribe were at my direction and 

personally dictated by me. I have reviewed the chart and agree that the record 

accurately reflects my personal performance of the history, physical exam, 

medical decision making, and the department course for this patient. I have also

personally directed, reviewed, and agree with the discharge instructions and dis

position.





Disposition





- Clinical Impression


Clinical Impression: 


 Back pain








- Patient ED Disposition


Is Patient to be Admitted: No


Doctor Will See Patient In The: Office


Counseled Patient/Family Regarding: Studies Performed, Diagnosis, Need For 

Followup





- Disposition


Referrals: 


[facility] [place] [Outside]


Disposition: Routine/Home


Disposition Time: 12:00


Condition: GOOD


Additional Instructions: 





ALEXIS MENDES, thank you for letting us take care of you today. Your provider 

was Candelario Martinez MD and you were treated for BACK PAIN. The emergency 

medical care you received today was directed at your acute symptoms. If you were

 prescribed any medication, please fill it and take as directed. It may take 

several days for your symptoms to resolve. Return to the Emergency Department if

 your symptoms worsen, do not improve, or if you have any other problems.





Please contact your doctor or call one of the physicians/clinics you have been 

referred to that are listed on the Patient Visit Information form that is 

included in your discharge packet. Bring any paperwork you were given at 

discharge with you along with any medications you are taking to your follow up 

visit. Our treatment cannot replace ongoing medical care by a primary care 

provider outside of the emergency department.





Thank you for allowing the [facility] team to be part of your care today.








If you had an X-Ray or CT scan: A Radiologist will review the ED reading if any 

change in treatment is needed we will contact you.***





If you had a blood, urine, or wound culture: It will take several days for the 

results, if any change in treatment is needed we will contact you.***





If you had an STI test: It will take 48 hours for the results. Please call after

 1 week if you have not heard back.***


Prescriptions: 


Cyclobenzaprine [Cyclobenzaprine HCl] 10 mg PO TID #15 tab


RX: Naproxen 375 mg PO Q8 PRN #21 tablet


 PRN Reason: Pain, Moderate (4-7)


Instructions:  Low Back Pain in Adults


Forms:  [email or web address] ([place]ian)


Print Language: Tamazight

## 2018-10-20 NOTE — RAD
Date of service: 



10/20/2018



HISTORY:

 back pain 



COMPARISON:

Comparison chest 04/02/2014 



FINDINGS:



LUNGS:

Minimal bibasilar atelectasis



PLEURA:

No significant pleural effusion identified, no pneumothorax apparent.



CARDIOVASCULAR:

Mild aortic atherosclerotic calcification present



Normal.



OSSEOUS STRUCTURES:

No significant abnormalities.



VISUALIZED UPPER ABDOMEN:

Normal.



OTHER FINDINGS:

None.



IMPRESSION:

Minimal bibasilar atelectasis.

## 2018-10-20 NOTE — CT
Date of service: 



10/20/2018



PROCEDURE:  CT Lumbar Spine without contrast



HISTORY:

Back pain no injury



COMPARISON:

None available.



TECHNIQUE:

Axial computed tomography images were obtained of the lumbar spine 

without the use of intravenous contrast. Coronal and sagittal 

reformatted images were created and reviewed. 



Radiation dose:



Total exam DLP = 1601.11 mGy-cm.



This CT exam was performed using one or more of the following dose 

reduction techniques: Automated exposure control, adjustment of the 

mA and/or kV according to patient size, and/or use of iterative 

reconstruction technique.



FINDINGS:



VERTEBRAE:

No acute compression fractures nor retropulsed fragments.  Minor 

chronic appearing anterior stature loss of several lower thoracic and 

upper lumbar segments felt to be degenerative in origin.  Vertebral 

bodies otherwise exhibit relatively normal stature.  Vertebral bodies 

and facets normally aligned. 



DISCS/SPINAL CANAL/NEURAL FORAMINA:

L1-2:    Mild posterior disc space narrowing.  No disc herniation or 

significant disc bulge. 



L2-3:    There is mild disc space narrowing with tiny chronic 

appearing Schmorl's node changes.  Small broad-based disc bulge is 

present and results in some flattening of the ventral borders of the 

thecal sac however the overall central canal appears adequate. 



L3-4:    Minor posterior disc space narrowing with mild broad-based 

bulge of the posterior annulus that extends slightly into the 

proximal inferior borders of both exit foramina..  No disc herniation 



L4-5: Disc space height maintained.  No disc herniation however minor 

broad-based disc bulging changes most pronounced within the proximal 

inferior margins of both exit foramina.  Facet joints mildly 

prominent.  Central canal and exit foramina appear adequate 



. 



L5-S1:  Disc space height relatively maintained.  Minor broad-based 

bulge of the posterior annulus is present slightly larger on the left 

than right.  The overall central bony canal appears adequate without 

evidence of compressive effects on the thecal sac or exiting nerve 

roots.  Facet joints slightly overgrown.  Left exit foramen is 

marginal to minimally narrowed.  Right exit foramen is adequate.. 



PARASPINAL SOFT TISSUES:

Unremarkable. 



OTHER FINDINGS:

There is mild aortic atherosclerotic calcification and mural plaque 



IMPRESSION:

No acute fractures.  Mild multilevel degenerative spondylosis without 

significant central canal or foraminal stenosis.



.

## 2018-10-21 VITALS — OXYGEN SATURATION: 98 %

## 2018-10-29 ENCOUNTER — HOSPITAL ENCOUNTER (OUTPATIENT)
Dept: HOSPITAL 14 - H.ENDO | Age: 68
Discharge: HOME | End: 2018-10-29
Attending: INTERNAL MEDICINE
Payer: COMMERCIAL

## 2018-10-29 VITALS — DIASTOLIC BLOOD PRESSURE: 56 MMHG | SYSTOLIC BLOOD PRESSURE: 104 MMHG

## 2018-10-29 VITALS — TEMPERATURE: 96.9 F | HEART RATE: 82 BPM | RESPIRATION RATE: 17 BRPM | OXYGEN SATURATION: 95 %

## 2018-10-29 VITALS — BODY MASS INDEX: 39 KG/M2

## 2018-10-29 DIAGNOSIS — Z12.11: Primary | ICD-10-CM

## 2018-10-29 DIAGNOSIS — K57.30: ICD-10-CM

## 2018-10-29 DIAGNOSIS — K64.8: ICD-10-CM

## 2018-10-29 PROCEDURE — 45378 DIAGNOSTIC COLONOSCOPY: CPT
